# Patient Record
Sex: FEMALE | Race: BLACK OR AFRICAN AMERICAN | NOT HISPANIC OR LATINO | RURAL
[De-identification: names, ages, dates, MRNs, and addresses within clinical notes are randomized per-mention and may not be internally consistent; named-entity substitution may affect disease eponyms.]

---

## 2022-03-16 VITALS — BODY MASS INDEX: 28.25 KG/M2 | WEIGHT: 180 LBS | HEIGHT: 67 IN

## 2022-03-16 RX ORDER — METFORMIN HYDROCHLORIDE 500 MG/1
500 TABLET ORAL
COMMUNITY
End: 2022-03-17

## 2022-03-16 RX ORDER — HYDROCHLOROTHIAZIDE 25 MG/1
25 TABLET ORAL DAILY
COMMUNITY
End: 2022-03-17

## 2022-03-16 RX ORDER — LAMOTRIGINE 25 MG/1
25 TABLET ORAL 2 TIMES DAILY
COMMUNITY
End: 2022-03-17

## 2022-03-16 RX ORDER — INSULIN GLARGINE 100 [IU]/ML
16 INJECTION, SOLUTION SUBCUTANEOUS DAILY
COMMUNITY
End: 2022-03-17 | Stop reason: SDUPTHER

## 2022-03-16 RX ORDER — ASCORBIC ACID 500 MG
1 TABLET ORAL DAILY
COMMUNITY
End: 2022-03-17 | Stop reason: ALTCHOICE

## 2022-03-16 RX ORDER — CARBAMAZEPINE 200 MG/1
200 TABLET, EXTENDED RELEASE ORAL 2 TIMES DAILY
COMMUNITY
End: 2022-03-17

## 2022-03-16 RX ORDER — GABAPENTIN 300 MG/1
300 CAPSULE ORAL 3 TIMES DAILY
COMMUNITY
End: 2022-03-17

## 2022-03-16 RX ORDER — FOLIC ACID 1 MG/1
1 TABLET ORAL DAILY
COMMUNITY
End: 2022-03-17 | Stop reason: ALTCHOICE

## 2022-03-16 RX ORDER — POTASSIUM CHLORIDE 750 MG/1
20 TABLET, EXTENDED RELEASE ORAL DAILY
COMMUNITY
End: 2022-03-17 | Stop reason: SDUPTHER

## 2022-03-16 RX ORDER — PANTOPRAZOLE SODIUM 20 MG/1
40 TABLET, DELAYED RELEASE ORAL DAILY
COMMUNITY
End: 2022-03-17 | Stop reason: SDUPTHER

## 2022-03-16 RX ORDER — ZIPRASIDONE HYDROCHLORIDE 80 MG/1
80 CAPSULE ORAL 2 TIMES DAILY WITH MEALS
COMMUNITY
End: 2022-03-17 | Stop reason: SDUPTHER

## 2022-03-17 ENCOUNTER — OFFICE VISIT (OUTPATIENT)
Dept: FAMILY MEDICINE | Facility: CLINIC | Age: 47
End: 2022-03-17
Payer: MEDICAID

## 2022-03-17 VITALS
DIASTOLIC BLOOD PRESSURE: 68 MMHG | HEART RATE: 95 BPM | SYSTOLIC BLOOD PRESSURE: 103 MMHG | BODY MASS INDEX: 29.51 KG/M2 | WEIGHT: 188 LBS | RESPIRATION RATE: 18 BRPM | HEIGHT: 67 IN

## 2022-03-17 DIAGNOSIS — E11.9 DIABETES MELLITUS WITHOUT COMPLICATION: ICD-10-CM

## 2022-03-17 DIAGNOSIS — I10 ESSENTIAL HYPERTENSION, MALIGNANT: Primary | ICD-10-CM

## 2022-03-17 DIAGNOSIS — F31.9 BIPOLAR AFFECTIVE DISORDER, REMISSION STATUS UNSPECIFIED: ICD-10-CM

## 2022-03-17 LAB
ALBUMIN SERPL BCP-MCNC: 3.4 G/DL (ref 3.5–5)
ALBUMIN/GLOB SERPL: 0.8 {RATIO}
ALP SERPL-CCNC: 132 U/L (ref 39–100)
ALT SERPL W P-5'-P-CCNC: 18 U/L (ref 13–56)
ANION GAP SERPL CALCULATED.3IONS-SCNC: 12 MMOL/L (ref 7–16)
AST SERPL W P-5'-P-CCNC: 11 U/L (ref 15–37)
BASOPHILS # BLD AUTO: 0.04 K/UL (ref 0–0.2)
BASOPHILS NFR BLD AUTO: 0.8 % (ref 0–1)
BILIRUB SERPL-MCNC: 0.3 MG/DL (ref 0–1.2)
BUN SERPL-MCNC: 7 MG/DL (ref 7–18)
BUN/CREAT SERPL: 9 (ref 6–20)
CALCIUM SERPL-MCNC: 8.9 MG/DL (ref 8.5–10.1)
CHLORIDE SERPL-SCNC: 98 MMOL/L (ref 98–107)
CHOLEST SERPL-MCNC: 182 MG/DL (ref 0–200)
CHOLEST/HDLC SERPL: 4 {RATIO}
CO2 SERPL-SCNC: 27 MMOL/L (ref 21–32)
CREAT SERPL-MCNC: 0.77 MG/DL (ref 0.55–1.02)
CREAT UR-MCNC: 84 MG/DL (ref 28–219)
DIFFERENTIAL METHOD BLD: ABNORMAL
EOSINOPHIL # BLD AUTO: 0.07 K/UL (ref 0–0.5)
EOSINOPHIL NFR BLD AUTO: 1.4 % (ref 1–4)
ERYTHROCYTE [DISTWIDTH] IN BLOOD BY AUTOMATED COUNT: 15 % (ref 11.5–14.5)
EST. AVERAGE GLUCOSE BLD GHB EST-MCNC: 370 MG/DL
GLOBULIN SER-MCNC: 4.2 G/DL (ref 2–4)
GLUCOSE SERPL-MCNC: 446 MG/DL (ref 74–106)
HBA1C MFR BLD HPLC: 13.7 % (ref 4.5–6.6)
HCT VFR BLD AUTO: 40.1 % (ref 38–47)
HDLC SERPL-MCNC: 46 MG/DL (ref 40–60)
HGB BLD-MCNC: 12 G/DL (ref 12–16)
IMM GRANULOCYTES # BLD AUTO: 0.01 K/UL (ref 0–0.04)
IMM GRANULOCYTES NFR BLD: 0.2 % (ref 0–0.4)
LDLC SERPL CALC-MCNC: 100 MG/DL
LDLC/HDLC SERPL: 2.2 {RATIO}
LYMPHOCYTES # BLD AUTO: 1.51 K/UL (ref 1–4.8)
LYMPHOCYTES NFR BLD AUTO: 29.5 % (ref 27–41)
MCH RBC QN AUTO: 24.7 PG (ref 27–31)
MCHC RBC AUTO-ENTMCNC: 29.9 G/DL (ref 32–36)
MCV RBC AUTO: 82.7 FL (ref 80–96)
MICROALBUMIN UR-MCNC: 2 MG/DL (ref 0–2.8)
MICROALBUMIN/CREAT RATIO PNL UR: 23.8 MG/G (ref 0–30)
MONOCYTES # BLD AUTO: 0.34 K/UL (ref 0–0.8)
MONOCYTES NFR BLD AUTO: 6.6 % (ref 2–6)
MPC BLD CALC-MCNC: 12.2 FL (ref 9.4–12.4)
NEUTROPHILS # BLD AUTO: 3.15 K/UL (ref 1.8–7.7)
NEUTROPHILS NFR BLD AUTO: 61.5 % (ref 53–65)
NONHDLC SERPL-MCNC: 136 MG/DL
NRBC # BLD AUTO: 0 X10E3/UL
NRBC, AUTO (.00): 0 %
PLATELET # BLD AUTO: 386 K/UL (ref 150–400)
POTASSIUM SERPL-SCNC: 3.6 MMOL/L (ref 3.5–5.1)
PROT SERPL-MCNC: 7.6 G/DL (ref 6.4–8.2)
RBC # BLD AUTO: 4.85 M/UL (ref 4.2–5.4)
SODIUM SERPL-SCNC: 133 MMOL/L (ref 136–145)
TRIGL SERPL-MCNC: 182 MG/DL (ref 35–150)
VLDLC SERPL-MCNC: 36 MG/DL
WBC # BLD AUTO: 5.12 K/UL (ref 4.5–11)

## 2022-03-17 PROCEDURE — 85025 COMPLETE CBC W/AUTO DIFF WBC: CPT | Mod: ,,, | Performed by: CLINICAL MEDICAL LABORATORY

## 2022-03-17 PROCEDURE — 83036 HEMOGLOBIN A1C: ICD-10-PCS | Mod: ,,, | Performed by: CLINICAL MEDICAL LABORATORY

## 2022-03-17 PROCEDURE — 80061 LIPID PANEL: CPT | Mod: ,,, | Performed by: CLINICAL MEDICAL LABORATORY

## 2022-03-17 PROCEDURE — 82570 MICROALBUMIN / CREATININE RATIO URINE: ICD-10-PCS | Mod: ,,, | Performed by: CLINICAL MEDICAL LABORATORY

## 2022-03-17 PROCEDURE — 99202 OFFICE O/P NEW SF 15 MIN: CPT | Mod: ,,, | Performed by: NURSE PRACTITIONER

## 2022-03-17 PROCEDURE — 3074F PR MOST RECENT SYSTOLIC BLOOD PRESSURE < 130 MM HG: ICD-10-PCS | Mod: ,,, | Performed by: NURSE PRACTITIONER

## 2022-03-17 PROCEDURE — 3078F PR MOST RECENT DIASTOLIC BLOOD PRESSURE < 80 MM HG: ICD-10-PCS | Mod: ,,, | Performed by: NURSE PRACTITIONER

## 2022-03-17 PROCEDURE — 82043 MICROALBUMIN / CREATININE RATIO URINE: ICD-10-PCS | Mod: ,,, | Performed by: CLINICAL MEDICAL LABORATORY

## 2022-03-17 PROCEDURE — 82043 UR ALBUMIN QUANTITATIVE: CPT | Mod: ,,, | Performed by: CLINICAL MEDICAL LABORATORY

## 2022-03-17 PROCEDURE — 3078F DIAST BP <80 MM HG: CPT | Mod: ,,, | Performed by: NURSE PRACTITIONER

## 2022-03-17 PROCEDURE — 82570 ASSAY OF URINE CREATININE: CPT | Mod: ,,, | Performed by: CLINICAL MEDICAL LABORATORY

## 2022-03-17 PROCEDURE — 3008F PR BODY MASS INDEX (BMI) DOCUMENTED: ICD-10-PCS | Mod: ,,, | Performed by: NURSE PRACTITIONER

## 2022-03-17 PROCEDURE — 80061 LIPID PANEL: ICD-10-PCS | Mod: ,,, | Performed by: CLINICAL MEDICAL LABORATORY

## 2022-03-17 PROCEDURE — 80053 COMPREHENSIVE METABOLIC PANEL: ICD-10-PCS | Mod: ,,, | Performed by: CLINICAL MEDICAL LABORATORY

## 2022-03-17 PROCEDURE — 83036 HEMOGLOBIN GLYCOSYLATED A1C: CPT | Mod: ,,, | Performed by: CLINICAL MEDICAL LABORATORY

## 2022-03-17 PROCEDURE — 99202 PR OFFICE/OUTPT VISIT, NEW, LEVL II, 15-29 MIN: ICD-10-PCS | Mod: ,,, | Performed by: NURSE PRACTITIONER

## 2022-03-17 PROCEDURE — 80053 COMPREHEN METABOLIC PANEL: CPT | Mod: ,,, | Performed by: CLINICAL MEDICAL LABORATORY

## 2022-03-17 PROCEDURE — 3074F SYST BP LT 130 MM HG: CPT | Mod: ,,, | Performed by: NURSE PRACTITIONER

## 2022-03-17 PROCEDURE — 85025 CBC WITH DIFFERENTIAL: ICD-10-PCS | Mod: ,,, | Performed by: CLINICAL MEDICAL LABORATORY

## 2022-03-17 PROCEDURE — 3008F BODY MASS INDEX DOCD: CPT | Mod: ,,, | Performed by: NURSE PRACTITIONER

## 2022-03-17 RX ORDER — INSULIN GLARGINE 100 [IU]/ML
16 INJECTION, SOLUTION SUBCUTANEOUS DAILY
Qty: 6 ML | Refills: 2 | Status: SHIPPED | OUTPATIENT
Start: 2022-03-17 | End: 2023-10-26 | Stop reason: SDUPTHER

## 2022-03-17 RX ORDER — POTASSIUM CHLORIDE 750 MG/1
20 TABLET, EXTENDED RELEASE ORAL DAILY
Qty: 180 TABLET | Refills: 1 | Status: SHIPPED | OUTPATIENT
Start: 2022-03-17

## 2022-03-17 RX ORDER — PANTOPRAZOLE SODIUM 20 MG/1
40 TABLET, DELAYED RELEASE ORAL DAILY
Qty: 180 TABLET | Refills: 1 | Status: SHIPPED | OUTPATIENT
Start: 2022-03-17 | End: 2023-10-26 | Stop reason: SDUPTHER

## 2022-03-17 RX ORDER — ZIPRASIDONE HYDROCHLORIDE 40 MG/1
40 CAPSULE ORAL 2 TIMES DAILY
Qty: 180 CAPSULE | Refills: 1 | Status: SHIPPED | OUTPATIENT
Start: 2022-03-17 | End: 2023-10-26 | Stop reason: SDUPTHER

## 2022-03-17 RX ORDER — ZIPRASIDONE HYDROCHLORIDE 40 MG/1
40 CAPSULE ORAL 2 TIMES DAILY
COMMUNITY
End: 2022-03-17 | Stop reason: SDUPTHER

## 2022-03-17 NOTE — PROGRESS NOTES
"New clinic note    Franchesca Harding is a 47 y.o. female      Chief Complaint   Patient presents with    [facility]    Hypertension    Diabetes        Subjective:  Pt presents to [facility]. She has a history of htn, dm. Depression, bipolar disorder and depression. She has been off all routine meds for greater than 1yr. She has not been followed routinely in quite some time. She reports overall she has been doing ok. 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     According to medical records she was court committed to [facility] due to  Bipolar and acute psychosis from 11/20/2016-12/12/2016. She denies any hallucinations of any type at this time. She does not appear anxious or nervous while in clinic.     Pt will need a glucometer for home monitoring. She is on insulin therapy and will need to monitor blood glucose bid and prn.    PMH:   htn   dm   bipolar  ?gout?    PSH:  Tubal    Social  Lives with "friend"  Denies tobacco use  Denies etoh  Denies recreational drug use    FH  HTN  DM      PLAN:  1.review previous home meds and resume therapy as able  2. Follow up with Health system for cont care and monitoring  3.Labl today for evaluation  4. Follow up in 1month to discuss labs, make sure pt has followed up with Health system, adjust meds as needed  5. Make referral to Eye clinc, gave info for walk-in [facility]  6.[facility] for glucometer for home monitoring        Past Medical History:   Diagnosis Date    Bipolar affective disorder in remission     Depression     Diabetes mellitus, type 2     Hypertension       Family History   Problem Relation Age of Onset    Diabetes Mother     Hypertension Mother     Alzheimer's disease Mother     Diabetes Sister     Diabetes Brother     Hypertension Brother       Past Surgical History:   Procedure Laterality Date    TUBAL LIGATION        Social History     Socioeconomic History    Marital status: Single   Tobacco Use " "   Smoking status: Never Smoker    Smokeless tobacco: Never Used   Substance and Sexual Activity    Alcohol use: Not Currently    Drug use: Never        Review of Systems   Constitutional: Negative for fatigue and fever.   HENT: Negative for nasal congestion and sore throat.    Eyes: Negative for visual disturbance.   Respiratory: Negative for chest tightness and shortness of breath.    Cardiovascular: Negative for chest pain and leg swelling.   Gastrointestinal: Negative for abdominal pain, change in bowel habit and change in bowel habit.   Endocrine: Negative for polydipsia, polyphagia and polyuria.   Genitourinary: Negative for dysuria and hematuria.   Musculoskeletal: Negative for back pain and leg pain.   Neurological: Negative for dizziness, syncope, weakness and light-headedness.        Objective:  /68 (BP Location: Left arm, Patient Position: Sitting, BP Method: Medium (Automatic))   Pulse 95   Resp 18   Ht 5' 7" (1.702 m)   Wt 85.3 kg (188 lb)   BMI 29.44 kg/m²    Physical Exam  Constitutional:       General: She is not in acute distress.     Appearance: Normal appearance.   HENT:      Head: Normocephalic.   Eyes:      Pupils: Pupils are equal, round, and reactive to light.   Cardiovascular:      Rate and Rhythm: Normal rate and regular rhythm.      Heart sounds: Normal heart sounds. No murmur heard.  Pulmonary:      Effort: Pulmonary effort is normal.      Breath sounds: Normal breath sounds. No wheezing or rhonchi.   Abdominal:      General: Bowel sounds are normal. There is no distension.      Hernia: No hernia is present.   Musculoskeletal:         General: No swelling or tenderness.      Right lower leg: No edema.      Left lower leg: No edema.   Neurological:      General: No focal deficit present.      Mental Status: She is alert and oriented to person, place, and time.          Assessment/plan:  1. Essential hypertension, malignant    2. Diabetes mellitus without complication    3. " Schizophrenia, schizo-affective         Problem List Items Addressed This Visit    None     Visit Diagnoses     Essential hypertension, malignant    -  Primary    cont to hold bp meds  bp looks good today   off meds for greater than 1 yr    Relevant Orders    CBC Auto Differential    Comprehensive Metabolic Panel    Lipid Panel    Microalbumin/Creatinine Ratio, Urine    Diabetes mellitus without complication        lab today  restart lantus  get home meter     Relevant Medications    insulin (LANTUS SOLOSTAR U-100 INSULIN) glargine 100 units/mL (3mL) SubQ pen    Other Relevant Orders    Hemoglobin A1C    Microalbumin/Creatinine Ratio, Urine    Ambulatory referral/consult to Optometry    Schizophrenia, schizo-affective        restart geodon  follow up with [facility]             Follow up in about 4 weeks (around 4/14/2022), or if symptoms worsen or fail to improve.

## 2022-03-22 RX ORDER — PEN NEEDLE, DIABETIC 33 GX5/32"
NEEDLE, DISPOSABLE MISCELLANEOUS
Qty: 100 EACH | Refills: 2 | Status: SHIPPED | OUTPATIENT
Start: 2022-03-22 | End: 2023-10-26 | Stop reason: SDUPTHER

## 2022-03-22 RX ORDER — PEN NEEDLE, DIABETIC 33 GX5/32"
NEEDLE, DISPOSABLE MISCELLANEOUS
COMMUNITY
End: 2022-03-22 | Stop reason: SDUPTHER

## 2023-10-26 ENCOUNTER — OFFICE VISIT (OUTPATIENT)
Dept: FAMILY MEDICINE | Facility: CLINIC | Age: 48
End: 2023-10-26
Payer: MEDICAID

## 2023-10-26 VITALS
OXYGEN SATURATION: 100 % | HEART RATE: 78 BPM | SYSTOLIC BLOOD PRESSURE: 133 MMHG | TEMPERATURE: 98 F | HEIGHT: 67 IN | WEIGHT: 173 LBS | BODY MASS INDEX: 27.15 KG/M2 | RESPIRATION RATE: 16 BRPM | DIASTOLIC BLOOD PRESSURE: 84 MMHG

## 2023-10-26 DIAGNOSIS — E11.9 DIABETES MELLITUS WITHOUT COMPLICATION: ICD-10-CM

## 2023-10-26 DIAGNOSIS — I10 ESSENTIAL HYPERTENSION, MALIGNANT: Primary | ICD-10-CM

## 2023-10-26 DIAGNOSIS — F31.9 BIPOLAR AFFECTIVE DISORDER, REMISSION STATUS UNSPECIFIED: ICD-10-CM

## 2023-10-26 LAB
ALBUMIN SERPL BCP-MCNC: 3.4 G/DL (ref 3.5–5)
ALBUMIN/GLOB SERPL: 0.7 {RATIO}
ALP SERPL-CCNC: 112 U/L (ref 39–100)
ALT SERPL W P-5'-P-CCNC: 17 U/L (ref 13–56)
ANION GAP SERPL CALCULATED.3IONS-SCNC: 11 MMOL/L (ref 7–16)
AST SERPL W P-5'-P-CCNC: 10 U/L (ref 15–37)
BILIRUB SERPL-MCNC: 0.3 MG/DL (ref ?–1.2)
BUN SERPL-MCNC: 11 MG/DL (ref 7–18)
BUN/CREAT SERPL: 15 (ref 6–20)
CALCIUM SERPL-MCNC: 9 MG/DL (ref 8.5–10.1)
CHLORIDE SERPL-SCNC: 102 MMOL/L (ref 98–107)
CHOLEST SERPL-MCNC: 156 MG/DL (ref 0–200)
CHOLEST/HDLC SERPL: 2.8 {RATIO}
CO2 SERPL-SCNC: 25 MMOL/L (ref 21–32)
CREAT SERPL-MCNC: 0.71 MG/DL (ref 0.55–1.02)
CREAT UR-MCNC: 160 MG/DL (ref 28–219)
EGFR (NO RACE VARIABLE) (RUSH/TITUS): 105 ML/MIN/1.73M2
EST. AVERAGE GLUCOSE BLD GHB EST-MCNC: 264 MG/DL
GLOBULIN SER-MCNC: 4.8 G/DL (ref 2–4)
GLUCOSE SERPL-MCNC: 271 MG/DL (ref 74–106)
HBA1C MFR BLD HPLC: 10.5 % (ref 4.5–6.6)
HDLC SERPL-MCNC: 56 MG/DL (ref 40–60)
LDLC SERPL CALC-MCNC: 91 MG/DL
LDLC/HDLC SERPL: 1.6 {RATIO}
MICROALBUMIN UR-MCNC: 248 MG/DL (ref 0–2.8)
MICROALBUMIN/CREAT RATIO PNL UR: 1550 MG/G (ref 0–30)
NONHDLC SERPL-MCNC: 100 MG/DL
POTASSIUM SERPL-SCNC: 3.9 MMOL/L (ref 3.5–5.1)
PROT SERPL-MCNC: 8.2 G/DL (ref 6.4–8.2)
SODIUM SERPL-SCNC: 134 MMOL/L (ref 136–145)
TRIGL SERPL-MCNC: 47 MG/DL (ref 35–150)
VLDLC SERPL-MCNC: 9 MG/DL

## 2023-10-26 PROCEDURE — 3008F BODY MASS INDEX DOCD: CPT | Mod: ,,, | Performed by: NURSE PRACTITIONER

## 2023-10-26 PROCEDURE — 80061 LIPID PANEL: ICD-10-PCS | Mod: ,,, | Performed by: CLINICAL MEDICAL LABORATORY

## 2023-10-26 PROCEDURE — 80053 COMPREHENSIVE METABOLIC PANEL: ICD-10-PCS | Mod: ,,, | Performed by: CLINICAL MEDICAL LABORATORY

## 2023-10-26 PROCEDURE — 82043 MICROALBUMIN / CREATININE RATIO URINE: ICD-10-PCS | Mod: ,,, | Performed by: CLINICAL MEDICAL LABORATORY

## 2023-10-26 PROCEDURE — 3008F PR BODY MASS INDEX (BMI) DOCUMENTED: ICD-10-PCS | Mod: ,,, | Performed by: NURSE PRACTITIONER

## 2023-10-26 PROCEDURE — 83036 HEMOGLOBIN A1C: ICD-10-PCS | Mod: ,,, | Performed by: CLINICAL MEDICAL LABORATORY

## 2023-10-26 PROCEDURE — 80061 LIPID PANEL: CPT | Mod: ,,, | Performed by: CLINICAL MEDICAL LABORATORY

## 2023-10-26 PROCEDURE — 99213 OFFICE O/P EST LOW 20 MIN: CPT | Mod: ,,, | Performed by: NURSE PRACTITIONER

## 2023-10-26 PROCEDURE — 82570 MICROALBUMIN / CREATININE RATIO URINE: ICD-10-PCS | Mod: ,,, | Performed by: CLINICAL MEDICAL LABORATORY

## 2023-10-26 PROCEDURE — 80053 COMPREHEN METABOLIC PANEL: CPT | Mod: ,,, | Performed by: CLINICAL MEDICAL LABORATORY

## 2023-10-26 PROCEDURE — 99213 PR OFFICE/OUTPT VISIT, EST, LEVL III, 20-29 MIN: ICD-10-PCS | Mod: ,,, | Performed by: NURSE PRACTITIONER

## 2023-10-26 PROCEDURE — 82570 ASSAY OF URINE CREATININE: CPT | Mod: ,,, | Performed by: CLINICAL MEDICAL LABORATORY

## 2023-10-26 PROCEDURE — 83036 HEMOGLOBIN GLYCOSYLATED A1C: CPT | Mod: ,,, | Performed by: CLINICAL MEDICAL LABORATORY

## 2023-10-26 PROCEDURE — 82043 UR ALBUMIN QUANTITATIVE: CPT | Mod: ,,, | Performed by: CLINICAL MEDICAL LABORATORY

## 2023-10-26 RX ORDER — PANTOPRAZOLE SODIUM 20 MG/1
40 TABLET, DELAYED RELEASE ORAL DAILY
Qty: 180 TABLET | Refills: 1 | Status: SHIPPED | OUTPATIENT
Start: 2023-10-26

## 2023-10-26 RX ORDER — AMITRIPTYLINE HYDROCHLORIDE 25 MG/1
25 TABLET, FILM COATED ORAL NIGHTLY
Qty: 90 TABLET | Refills: 1 | Status: SHIPPED | OUTPATIENT
Start: 2023-10-26

## 2023-10-26 RX ORDER — PEN NEEDLE, DIABETIC 33 GX5/32"
NEEDLE, DISPOSABLE MISCELLANEOUS
Qty: 100 EACH | Refills: 2 | Status: SHIPPED | OUTPATIENT
Start: 2023-10-26

## 2023-10-26 RX ORDER — INSULIN DETEMIR 100 [IU]/ML
16 INJECTION, SOLUTION SUBCUTANEOUS DAILY
Qty: 10 ML | Refills: 1 | Status: SHIPPED | OUTPATIENT
Start: 2023-10-26

## 2023-10-26 RX ORDER — ZIPRASIDONE HYDROCHLORIDE 40 MG/1
40 CAPSULE ORAL 2 TIMES DAILY
Qty: 180 CAPSULE | Refills: 1 | Status: SHIPPED | OUTPATIENT
Start: 2023-10-26

## 2023-10-26 NOTE — PROGRESS NOTES
NANNETTE Wagoner   [facility] [address] [place] [address]  [phone]      PATIENT NAME: Franchesca Lester  : 1975  DATE: 10/26/23  MRN: [id number]      Billing Provider: NANNETTE Wagoner  Level of Service:   Patient PCP Information       Provider PCP Type    NANNETTE Wagoner General            Reason for Visit / Chief Complaint: Foot Pain       Update PCP  Update Chief Complaint         History of Present Illness / Problem Focused Workflow     Franchesca Lester presents to the clinic with Foot Pain     Patient presents for routine evaluation for bilateral foot pain.  Patient has not been seen in the clinic since 2022.  She has been taking care of her significant other who passed away in 2023.  Patient has a past medical history of hypertension diabetes depression and bipolar.    Patient reports today stating that she was having some burning and tingling in her feet.  She reports that she has been compliant with her insulin she is been buying it over-the-counter.  She does not monitor her blood sugars.  Recommended the patient obtain a meter to check her sugars at least daily if not b.i.d. recommend b.i.d. monitoring since she was on insulin therapy.  Goal is a blood sugar between 100-130 with an A1c of less than 7.  Blood pressure appears to be well controlled today we will continue previous home medicines    Diabetic foot exam today shows some decreased sensation bilaterally we will start her on amitriptyline.    Discussed with patient the importance of routine follow-up and for self-care.  We will obtain routine labs today we will discuss health maintenance issues at next appointment.        Review of Systems     Review of Systems   Constitutional:  Negative for fatigue and fever.   HENT:  Negative for nasal congestion and sore throat.    Eyes:  Negative for visual disturbance.  "  Respiratory:  Negative for chest tightness and shortness of breath.    Cardiovascular:  Negative for chest pain and leg swelling.   Gastrointestinal:  Negative for abdominal pain and change in bowel habit.   Endocrine: Negative for polydipsia, polyphagia and polyuria.   Genitourinary:  Negative for dysuria and hematuria.   Musculoskeletal:  Positive for myalgias. Negative for back pain and leg pain.   Integumentary:  Negative for rash.   Neurological:  Negative for dizziness, syncope, weakness and light-headedness.        Medical / Social / Family History     Past Medical History:   Diagnosis Date    Bipolar affective disorder in remission     Depression     Diabetes mellitus, type 2     Hypertension        Past Surgical History:   Procedure Laterality Date    TUBAL LIGATION         Social History  Ms. Lester  reports that she has never smoked. She has never used smokeless tobacco. She reports that she does not currently use alcohol. She reports that she does not use drugs.    Family History  Ms. Lester's family history includes Alzheimer's disease in her mother; Diabetes in her brother, mother, and sister; Hypertension in her brother and mother.    Medications and Allergies     Medications  Outpatient Medications Marked as Taking for the 10/26/23 encounter (Office Visit) with Petty Booker ACNP   Medication Sig Dispense Refill    potassium chloride SA (K-DUR,KLOR-CON) 10 MEQ tablet Take 2 tablets (20 mEq total) by mouth once daily. 180 tablet 1    [DISCONTINUED] insulin (LANTUS SOLOSTAR U-100 INSULIN) glargine 100 units/mL (3mL) SubQ pen Inject 16 Units into the skin once daily. 6 mL 2    [DISCONTINUED] pantoprazole (PROTONIX) 20 MG tablet Take 2 tablets (40 mg total) by mouth once daily. 180 tablet 1    [DISCONTINUED] pen needle, diabetic 33 gauge x 5/32" Ndle As needed for daily administration of insulin 100 each 2    [DISCONTINUED] ziprasidone (GEODON) 40 MG Cap Take 1 capsule (40 mg total) by mouth 2 " (two) times daily. With food at 7am and 5pm 180 capsule 1       Allergies  Review of patient's allergies indicates:   Allergen Reactions    Metformin Rash    Penicillins Rash       Physical Examination     Vitals:    10/26/23 0925   BP: 133/84   Pulse: 78   Resp: 16   Temp: 98.2 °F (36.8 °C)     Physical Exam  Eyes:      Pupils: Pupils are equal, round, and reactive to light.   Cardiovascular:      Rate and Rhythm: Normal rate and regular rhythm.      Pulses:           Dorsalis pedis pulses are 2+ on the right side and 2+ on the left side.      Heart sounds: Normal heart sounds. No murmur heard.  Pulmonary:      Breath sounds: Normal breath sounds. No wheezing, rhonchi or rales.   Abdominal:      General: Bowel sounds are normal.   Musculoskeletal:         General: No swelling.      Cervical back: Normal range of motion and neck supple.        Feet:    Feet:      Right foot:      Protective Sensation: 10 sites tested.  8 sites sensed.      Skin integrity: Skin integrity normal.      Toenail Condition: Right toenails are normal.      Left foot:      Protective Sensation: 10 sites tested.  8 sites sensed.      Skin integrity: Skin integrity normal.      Comments: Protective Sensation (w/ 10 gram monofilament):  Right: Decreased  Left: Decreased    Visual Inspection:  Normal -  Bilateral    Pedal Pulses:   Right: Present  Left: Present    Posterior Tibialis Pulses:   Right:Present  Left: Present     Skin:     General: Skin is warm and dry.   Neurological:      Mental Status: She is alert and oriented to person, place, and time.          Lab Results   Component Value Date    WBC 5.12 03/17/2022    HGB 12.0 03/17/2022    HCT 40.1 03/17/2022    MCV 82.7 03/17/2022     03/17/2022        Sodium   Date Value Ref Range Status   03/17/2022 133 (L) 136 - 145 mmol/L Final     Potassium   Date Value Ref Range Status   03/17/2022 3.6 3.5 - 5.1 mmol/L Final     Chloride   Date Value Ref Range Status   03/17/2022 98 98 - 107  "mmol/L Final     CO2   Date Value Ref Range Status   03/17/2022 27 21 - 32 mmol/L Final     Glucose   Date Value Ref Range Status   03/17/2022 446 (H) 74 - 106 mg/dL Final     BUN   Date Value Ref Range Status   03/17/2022 7 7 - 18 mg/dL Final     Creatinine   Date Value Ref Range Status   03/17/2022 0.77 0.55 - 1.02 mg/dL Final     Calcium   Date Value Ref Range Status   03/17/2022 8.9 8.5 - 10.1 mg/dL Final     Total Protein   Date Value Ref Range Status   03/17/2022 7.6 6.4 - 8.2 g/dL Final     Albumin   Date Value Ref Range Status   03/17/2022 3.4 (L) 3.5 - 5.0 g/dL Final     Bilirubin, Total   Date Value Ref Range Status   03/17/2022 0.3 0.0 - 1.2 mg/dL Final     Alk Phos   Date Value Ref Range Status   03/17/2022 132 (H) 39 - 100 U/L Final     AST   Date Value Ref Range Status   03/17/2022 11 (L) 15 - 37 U/L Final     ALT   Date Value Ref Range Status   03/17/2022 18 13 - 56 U/L Final     Anion Gap   Date Value Ref Range Status   03/17/2022 12 7 - 16 mmol/L Final      Lab Results   Component Value Date    HGBA1C 13.7 (H) 03/17/2022      Lab Results   Component Value Date    CHOL 182 03/17/2022     Lab Results   Component Value Date    HDL 46 03/17/2022     Lab Results   Component Value Date    LDLCALC 100 03/17/2022     No results found for: "DLDL"  Lab Results   Component Value Date    TRIG 182 (H) 03/17/2022     Lab Results   Component Value Date    CHOLHDL 4.0 03/17/2022      No results found for: "TSH", "T9TSEQR", "C5MGZGT", "THYROIDAB", "FREET4"     Assessment and Plan (including Health Maintenance)      Problem List  Smart Sets  Document Outside HM   :    Plan:     1. Essential hypertension, malignant  -     Lipid Panel; Future; Expected date: 10/26/2023  -     Microalbumin/Creatinine Ratio, Urine    2. Diabetes mellitus without complication  -     insulin detemir U-100 (LEVEMIR U-100 INSULIN) 100 unit/mL injection; Inject 16 Units into the skin once daily.  Dispense: 10 mL; Refill: 1  -     pen needle, " "diabetic 33 gauge x 5/32" Ndle; As needed for daily administration of insulin  Dispense: 100 each; Refill: 2  -     amitriptyline (ELAVIL) 25 MG tablet; Take 1 tablet (25 mg total) by mouth every evening.  Dispense: 90 tablet; Refill: 1  -     Comprehensive Metabolic Panel; Future; Expected date: 10/26/2023  -     Lipid Panel; Future; Expected date: 10/26/2023  -     Hemoglobin A1C; Future; Expected date: 10/26/2023    3. Bipolar affective disorder, remission status unspecified  -     ziprasidone (GEODON) 40 MG Cap; Take 1 capsule (40 mg total) by mouth 2 (two) times daily. With food at 7am and 5pm  Dispense: 180 capsule; Refill: 1    Other orders  -     pantoprazole (PROTONIX) 20 MG tablet; Take 2 tablets (40 mg total) by mouth once daily.  Dispense: 180 tablet; Refill: 1         There are no Patient Instructions on file for this visit.     Health Maintenance Due   Topic Date Due    Hepatitis C Screening  Never done    Cervical Cancer Screening  Never done    Pneumococcal Vaccines (Age 0-64) (1 - PCV) Never done    Foot Exam  Never done    Eye Exam  Never done    HIV Screening  Never done    TETANUS VACCINE  Never done    Mammogram  Never done    Low Dose Statin  Never done    Colorectal Cancer Screening  Never done    Hemoglobin A1c  09/17/2022    Diabetes Urine Screening  03/17/2023    Lipid Panel  03/17/2023    Influenza Vaccine (1) Never done         The patient has no Health Maintenance topics of status Not Due    Future Appointments   Date Time Provider Department Center   1/29/2024 10:00 AM Petty Booker ACNP [facility]            Signature:  NANNETTE Wagoner  [facility] [address] [place] [address]  [phone]    Date of encounter: 10/26/23    "

## 2024-01-04 ENCOUNTER — PATIENT OUTREACH (OUTPATIENT)
Dept: ADMINISTRATIVE | Facility: HOSPITAL | Age: 49
End: 2024-01-04

## 2024-01-04 NOTE — PROGRESS NOTES
Reviewed measures for population health/pap report   [facility] and [facility] was reviewed  Pt has upcoming appt on 1/29/2024. Appt note has been updated, discuss care gaps  Health Maintenance Due   Topic Date Due    Hepatitis C Screening  Never done    Cervical Cancer Screening  Never done    Pneumococcal Vaccines (Age 0-64) (1 - PCV) Never done    Eye Exam  Never done    HIV Screening  Never done    TETANUS VACCINE  Never done    Mammogram  Never done    Low Dose Statin  Never done    Colorectal Cancer Screening  Never done    Influenza Vaccine (1) Never done    Hemoglobin A1c  01/26/2024

## 2024-04-14 ENCOUNTER — HOSPITAL ENCOUNTER (EMERGENCY)
Facility: HOSPITAL | Age: 49
Discharge: HOME | End: 2024-04-14
Attending: EMERGENCY MEDICINE
Payer: MEDICAID

## 2024-04-14 VITALS
TEMPERATURE: 99 F | OXYGEN SATURATION: 100 % | RESPIRATION RATE: 17 BRPM | DIASTOLIC BLOOD PRESSURE: 71 MMHG | WEIGHT: 170 LBS | HEART RATE: 98 BPM | SYSTOLIC BLOOD PRESSURE: 106 MMHG | BODY MASS INDEX: 26.68 KG/M2 | HEIGHT: 67 IN

## 2024-04-14 DIAGNOSIS — M25.561 ARTHRALGIA OF RIGHT KNEE: ICD-10-CM

## 2024-04-14 DIAGNOSIS — M25.569 KNEE PAIN: ICD-10-CM

## 2024-04-14 DIAGNOSIS — E13.49 OTHER DIABETIC NEUROLOGICAL COMPLICATION ASSOCIATED WITH OTHER SPECIFIED DIABETES MELLITUS: Primary | ICD-10-CM

## 2024-04-14 DIAGNOSIS — M79.671 FOOT PAIN, RIGHT: ICD-10-CM

## 2024-04-14 LAB — GLUCOSE SERPL-MCNC: 399 MG/DL (ref 70–105)

## 2024-04-14 PROCEDURE — 82962 GLUCOSE BLOOD TEST: CPT

## 2024-04-14 PROCEDURE — 99284 EMERGENCY DEPT VISIT MOD MDM: CPT | Mod: ,,, | Performed by: EMERGENCY MEDICINE

## 2024-04-14 PROCEDURE — 63600175 PHARM REV CODE 636 W HCPCS: Performed by: EMERGENCY MEDICINE

## 2024-04-14 PROCEDURE — 99284 EMERGENCY DEPT VISIT MOD MDM: CPT | Mod: 25

## 2024-04-14 PROCEDURE — 96372 THER/PROPH/DIAG INJ SC/IM: CPT | Performed by: EMERGENCY MEDICINE

## 2024-04-14 RX ORDER — GABAPENTIN 100 MG/1
100 CAPSULE ORAL 3 TIMES DAILY
Qty: 90 CAPSULE | Refills: 2 | Status: SHIPPED | OUTPATIENT
Start: 2024-04-14 | End: 2025-04-14

## 2024-04-14 RX ORDER — KETOROLAC TROMETHAMINE 30 MG/ML
30 INJECTION, SOLUTION INTRAMUSCULAR; INTRAVENOUS
Status: COMPLETED | OUTPATIENT
Start: 2024-04-14 | End: 2024-04-14

## 2024-04-14 RX ADMIN — KETOROLAC TROMETHAMINE 30 MG: 30 INJECTION, SOLUTION INTRAMUSCULAR at 08:04

## 2024-04-14 NOTE — DISCHARGE INSTRUCTIONS
Stop taking amitriptyline/Elavil pill instead will start you on Neurontin/gabapentin.  This medication may to a couple weeks to take effect.  Follow up with primary care because the dose may need be to adjusted or you may need to be switched to a different drug    Use ibuprofen 400-600 mg up to 3 times a day for 3 days.  If taking more than 3 days reduce your dose to 200-400 mg and do not take more than 10 days in a row.    Use over-the-counter knee brace as needed    Ambulatory referral was made to orthopedist

## 2024-04-14 NOTE — ED PROVIDER NOTES
Encounter Date: 4/14/2024       History     Chief Complaint   Patient presents with    Leg Pain     Patient complains of right knee pain worse over the past few days.  She has had knee pain intermittently chronically.  No recent injuries.  Also complains of bilateral foot numbness and burning pain does have history of diabetes.  At 1st Elavil help but now is not helping as much as it used to.  No associated calf tenderness or swelling.  No shortness of breath or chest pain.  No fever.  No claudication      Review of patient's allergies indicates:   Allergen Reactions    Metformin Rash    Penicillins Rash     Past Medical History:   Diagnosis Date    Bipolar affective disorder in remission     Depression     Diabetes mellitus, type 2     Hypertension      Past Surgical History:   Procedure Laterality Date    TUBAL LIGATION       Family History   Problem Relation Name Age of Onset    Diabetes Mother      Hypertension Mother      Alzheimer's disease Mother      Diabetes Sister      Diabetes Brother      Hypertension Brother       Social History     Tobacco Use    Smoking status: Never    Smokeless tobacco: Never   Substance Use Topics    Alcohol use: Not Currently    Drug use: Never     Review of Systems   Constitutional:  Negative for fever.   HENT:  Negative for sore throat.    Respiratory:  Negative for shortness of breath.    Cardiovascular:  Negative for chest pain.   Gastrointestinal:  Negative for nausea.   Genitourinary:  Negative for dysuria.   Musculoskeletal:  Positive for arthralgias. Negative for back pain.   Skin:  Negative for rash.   Neurological:  Negative for weakness.   Hematological:  Does not bruise/bleed easily.       Physical Exam     Initial Vitals [04/14/24 0731]   BP Pulse Resp Temp SpO2   106/72 102 17 98.9 °F (37.2 °C) 99 %      MAP       --         Physical Exam    Nursing note and vitals reviewed.  Constitutional: She appears well-developed and well-nourished.   HENT:   Head: Normocephalic  and atraumatic.   Eyes: EOM are normal. Pupils are equal, round, and reactive to light.   Neck: Neck supple. No thyromegaly present.   Normal range of motion.  Cardiovascular:  Normal rate, regular rhythm, normal heart sounds and intact distal pulses.           No murmur heard.  Pulmonary/Chest: Breath sounds normal. No respiratory distress. She has no wheezes.   Abdominal: Abdomen is soft. Bowel sounds are normal. She exhibits no distension. There is no abdominal tenderness.   Musculoskeletal:         General: Tenderness present. No edema. Normal range of motion.      Cervical back: Normal range of motion and neck supple.      Comments: Mild tenderness right knee.  No effusion.  Crepitus noted.  Full range of motion.  Dorsalis pedis pulse intact bilateral.  No discoloration of the skin of the lower legs.  No calf tenderness or swelling.  No swelling or redness of the foot to indicate gout.  No ulcers or skin wounds     Lymphadenopathy:     She has no cervical adenopathy.   Neurological: She is alert and oriented to person, place, and time. She has normal strength. No cranial nerve deficit or sensory deficit.   Skin: Skin is warm and dry. No rash noted.   Psychiatric: She has a normal mood and affect.         Medical Screening Exam   See Full Note    ED Course   Procedures  Labs Reviewed   POCT GLUCOSE MONITORING CONTINUOUS - Abnormal; Notable for the following components:       Result Value    POC Glucose 399 (*)     All other components within normal limits          Imaging Results              X-Ray Knee 1 or 2 View Right (Final result)  Result time 04/14/24 09:41:46      Final result by Tulio Montes DO (04/14/24 09:41:46)                   Impression:      As above.    Point of Service: [facility]      Electronically signed by: Tulio Montes  Date:    04/14/2024  Time:    09:41               Narrative:    EXAMINATION:  XR KNEE 1 OR 2 VIEW RIGHT    CLINICAL HISTORY:  Pain in unspecified  knee    COMPARISON:  None    TECHNIQUE:  Frontal and lateral views of the right knee.    FINDINGS:  Mild tricompartmental degenerative change.  No convincing acute fracture or dislocation demonstrated. No concerning radiopaque foreign body visualized.  Mild spurring of the superior pole of patella.                                       X-Ray Foot Complete Right (Final result)  Result time 04/14/24 09:57:22      Final result by Tulio Montes DO (04/14/24 09:57:22)                   Impression:      As above.    Point of Service: [facility]      Electronically signed by: Tulio Montes  Date:    04/14/2024  Time:    09:57               Narrative:    EXAMINATION:  XR FOOT COMPLETE 3 VIEW RIGHT    CLINICAL HISTORY:  Pain in right foot    COMPARISON:  None    TECHNIQUE:  Frontal, lateral, and oblique views of the right foot.    FINDINGS:  Mild degenerative change of the foot.  No convincing acute fracture or dislocation demonstrated. No concerning radiopaque foreign body visualized.                                       Medications   ketorolac injection 30 mg (30 mg Intramuscular Given 4/14/24 0846)     Medical Decision Making  MDM    Patient presents for emergent evaluation of acute right knee pain right foot pain that poses a threat to life and/or bodily function.    In the ED patient found to have acute osteoarthritis.    I ordered X-rays and personally reviewed them and reviewed the radiologist interpretation.  Xray significant for degenerative change of the foot in the.      Discharge MDM  Patient was managed in the ED with Toradol   The response to treatment was stable.    Patient was discharged in stable condition.  Detailed return precautions discussed.     Amount and/or Complexity of Data Reviewed  Radiology: ordered.    Risk  Prescription drug management.                                      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 Provider    gabapentin (NEURONTIN) 100 MG capsule Take 1 capsule (100 mg total) by mouth 3 (three) times daily. 90 capsule 4/14/2024 4/14/2025 Satnam Hernandez MD          Follow-up Information    None          Satnam Hernandez MD  04/14/24 1521

## 2024-04-29 ENCOUNTER — OFFICE VISIT (OUTPATIENT)
Dept: ORTHOPEDICS | Facility: CLINIC | Age: 49
End: 2024-04-29
Payer: MEDICAID

## 2024-04-29 DIAGNOSIS — M25.561 RIGHT KNEE PAIN, UNSPECIFIED CHRONICITY: Primary | ICD-10-CM

## 2024-04-29 DIAGNOSIS — M23.91 INTERNAL DERANGEMENT OF RIGHT KNEE: ICD-10-CM

## 2024-04-29 DIAGNOSIS — M25.561 ARTHRALGIA OF RIGHT KNEE: ICD-10-CM

## 2024-04-29 PROCEDURE — 99204 OFFICE O/P NEW MOD 45 MIN: CPT | Mod: S$PBB,,, | Performed by: ORTHOPAEDIC SURGERY

## 2024-04-29 PROCEDURE — 99213 OFFICE O/P EST LOW 20 MIN: CPT | Mod: PBBFAC | Performed by: ORTHOPAEDIC SURGERY

## 2024-04-29 NOTE — PROGRESS NOTES
"            Clinic Note        CC:   Chief Complaint   Patient presents with    Right Knee - Pain        Principal problem: Right knee pain, unspecified chronicity [M25.561]     REASON FOR VISIT:       HISTORY:  49-year-old female complaining of right knee pain she had an injury little over 6 weeks ago.  Two weeks ago she went to the doctor her x-rays show she has some mild degenerative changes she is having pain in the medial aspect of the knee she also has little bit of neuropathy in her feet that she was not have worked up.      PAST MEDICAL HISTORY:   Past Medical History:   Diagnosis Date    Bipolar affective disorder in remission     Depression     Diabetes mellitus, type 2     Hypertension           PAST SURGICAL HISTORY:   Past Surgical History:   Procedure Laterality Date    TUBAL LIGATION            ALLERGIES:   Review of patient's allergies indicates:   Allergen Reactions    Metformin Rash    Penicillins Rash        MEDICATIONS :    Current Outpatient Medications:     gabapentin (NEURONTIN) 100 MG capsule, Take 1 capsule (100 mg total) by mouth 3 (three) times daily., Disp: 90 capsule, Rfl: 2    insulin detemir U-100 (LEVEMIR U-100 INSULIN) 100 unit/mL injection, Inject 16 Units into the skin once daily., Disp: 10 mL, Rfl: 1    pantoprazole (PROTONIX) 20 MG tablet, Take 2 tablets (40 mg total) by mouth once daily., Disp: 180 tablet, Rfl: 1    pen needle, diabetic 33 gauge x 5/32" Ndle, As needed for daily administration of insulin, Disp: 100 each, Rfl: 2    potassium chloride SA (K-DUR,KLOR-CON) 10 MEQ tablet, Take 2 tablets (20 mEq total) by mouth once daily., Disp: 180 tablet, Rfl: 1    ziprasidone (GEODON) 40 MG Cap, Take 1 capsule (40 mg total) by mouth 2 (two) times daily. With food at 7am and 5pm, Disp: 180 capsule, Rfl: 1     SOCIAL HISTORY:   Social History     Socioeconomic History    Marital status: Single   Tobacco Use    Smoking status: Never    Smokeless tobacco: Never   Substance and Sexual " Activity    Alcohol use: Not Currently    Drug use: Never          FAMILY HISTORY:   Family History   Problem Relation Name Age of Onset    Diabetes Mother      Hypertension Mother      Alzheimer's disease Mother      Diabetes Sister      Diabetes Brother      Hypertension Brother            PHYSICAL EXAM:  In general, this is a well-developed, well-nourished female . The patient is alert, oriented and cooperative.      HEENT:  Normocephalic, atraumatic.  Extraocular movements are intact bilaterally.  The oropharynx is benign.       NECK:  Nontender with good range of motion.      LUNGS:  Clear to auscultation bilaterally.      HEART:  Demonstrates a regular rate and rhythm.  No murmurs appreciated.      ABDOMEN:  Soft, non-tender, non-distended.        EXTREMITIES:  Right lower extremity she moves her toes she has sensation to touch has palpable pulses she has motor function 5/5 distally.  Tenderness over lateral joint line some tenderness over the medial joint line some pain on Scott's testing slight effusion noted no instability in the knee noted she has full range motion but she has pain on last 5° of extension and on flexion past 90 in the knee.      RADIOGRAPHIC FINDINGS:  X-rays show no fracture subluxation of the right knee      IMPRESSION: Right knee pain, unspecified chronicity  -     MRI Knee Without Contrast Right; Future; Expected date: 04/29/2024    Arthralgia of right knee  -     Ambulatory referral/consult to Orthopedics         PLAN:  This time she has an exam consistent with a medial meniscus tear.  She had been taken anti-inflammatories not getting relief the symptoms.  She was taught strengthening exercises when she went to the ER.  This time feel like she has a meniscal tear in the knee.  I will get the MRI.  There are no Patient Instructions on file for this visit.      No follow-ups on file.         John Hollins      (Subject to voice recognition error, transcription service not  allowed)